# Patient Record
Sex: FEMALE | Race: WHITE | NOT HISPANIC OR LATINO | Employment: OTHER | URBAN - METROPOLITAN AREA
[De-identification: names, ages, dates, MRNs, and addresses within clinical notes are randomized per-mention and may not be internally consistent; named-entity substitution may affect disease eponyms.]

---

## 2017-01-19 ENCOUNTER — TELEPHONE (OUTPATIENT)
Dept: CARDIOLOGY | Facility: CLINIC | Age: 82
End: 2017-01-19

## 2017-01-19 NOTE — TELEPHONE ENCOUNTER
01/19/17  11:12 AM  I spoke with Sandra to see if she had appts to see patient today. She stated she could she patient today, but she suggested they may be more comfortable being seen tomorrow as Dr. Negro is in office.    I called April and offered them an appt today or tomorrow with Sandra Barrett. They accepted an appt with Sandra tomorrow, 1/20 at 1pm.    Latoya ANDERSON RN

## 2017-01-19 NOTE — TELEPHONE ENCOUNTER
01/19/17  9:31 AM  Lupe Sanchez  5/26/1926    Mobile [phone]     Ms. Simon calls about her mother who's HR starting yesterday has been 100-110. She states she called the service last night and was instructed to give her mother an extra dose of amiodarone (200mg). Ms. Hazel states it is very difficult to measure BP on this patient and last night she measured patient's BP at 100 palpated. Her BP has not been measured this morning. This morning . She was on 3L O2 with O2 sats in low 90s; she increased O2 to 4L and patient now sats 97%.     Ms. Hazel states patient's  of 70 years just passed away last week and feels her increased HR may be related to that. I see that she has canceled appts with [facility] and [facility] on 12/16, 12/17, and 1/11. I offered her an appt with [facility] this week. She states she would prefer not bring her mother in unless necessary.    Do you want to make medications adjustments? Do you want pt to be seen?    Latoya ANDERSON RN

## 2017-01-20 ENCOUNTER — OFFICE VISIT (OUTPATIENT)
Dept: CARDIOLOGY | Facility: CLINIC | Age: 82
End: 2017-01-20

## 2017-01-20 ENCOUNTER — CLINICAL SUPPORT NO REQUIREMENTS (OUTPATIENT)
Dept: CARDIOLOGY | Facility: CLINIC | Age: 82
End: 2017-01-20

## 2017-01-20 VITALS — WEIGHT: 150 LBS | HEIGHT: 68 IN | BODY MASS INDEX: 22.73 KG/M2 | HEART RATE: 80 BPM

## 2017-01-20 DIAGNOSIS — I48.19 PERSISTENT ATRIAL FIBRILLATION (HCC): Primary | ICD-10-CM

## 2017-01-20 DIAGNOSIS — I49.5 SICK SINUS SYNDROME (HCC): Primary | ICD-10-CM

## 2017-01-20 PROCEDURE — 99213 OFFICE O/P EST LOW 20 MIN: CPT | Performed by: NURSE PRACTITIONER

## 2017-01-20 PROCEDURE — 93000 ELECTROCARDIOGRAM COMPLETE: CPT | Performed by: NURSE PRACTITIONER

## 2017-01-20 PROCEDURE — 93280 PM DEVICE PROGR EVAL DUAL: CPT | Performed by: INTERNAL MEDICINE

## 2017-01-20 NOTE — PROGRESS NOTES
Date of Office Visit: 2017  Encounter Provider: CURTIS Sandoval  Place of Service: [facility] CARDIOLOGY  Patient Name: Lupe Sanchez  :1926    Chief Complaint   Patient presents with   • Atrial Fibrillation   • Congestive Heart Failure   • Hypertension   :     HPI: Lupe Sanchez is a 89 y.o. female comes in today for complaints of tachycardia and shortness of air.  She is a patient of Dr. Negro's and I'm seeing her for the first time today. She was first seen by Dr. Negro in 2015 and transferred care from a physician in [place].  She has a history of atrial fibrillation and diastolic heart failure. She was placed on amiodarone and digoxin at the time that she was diagnosed with atrial fibrillation.  She has a history of pre-existing lung disease and has used oxygen continuously.  She also has a pacemaker placed that was apparently Placed for tachycardia bradycardia syndrome.  This was placed in [place]. In May 2016, she went to the ER for syncope and was found to be hypotensive.  She was started on Midodrine at that time.   Today, she comes in a wheelchair.  She is accompanied by 2 of her daughters.  She reports that on Wednesday she was feeling rapid atrial fibrillation.  She had been feeling this for about 36 hours.  She called the answering service and she was told to take an extra amiodarone.  She is not on metoprolol because she is frequently hypotensive.  She recently lost her  of 71 years last .  She has also been fighting an upper respiratory infection, has been taking many nebulizers that contain albuterol.  She denies having any lower extremity edema.  She says she has them in her ankles and feet, but this is the same.  She denies chest pain.  She denies orthopnea or PND.  She does have shortness of breath, but again she is 89 years old.  This has not changed.  The morning after taking her extra dose of amiodarone, she did  feel better.  She has felt okay since then.          Past Medical History   Diagnosis Date   • Anxiety    • Arthritis    • Asthma    • Atrial fibrillation    • CHF (congestive heart failure)    • CVD (cerebrovascular disease)    • GERD (gastroesophageal reflux disease)    • Health care maintenance    • High risk medication use    • Hyperlipidemia    • Hypertension    • Hypothyroidism    • Hypoxia    • Pacemaker    • PAF (paroxysmal atrial fibrillation)    • Reactive airways dysfunction syndrome    • Tachycardia-bradycardia syndrome    • Transient cerebral ischemia        Past Surgical History   Procedure Laterality Date   • Hysterectomy     • Replacement total knee bilateral     • Pacemaker implantation             Review of Systems   Constitution: Positive for weakness and malaise/fatigue. Negative for fever.   HENT: Negative for ear pain, hearing loss, nosebleeds and sore throat.    Eyes: Negative for double vision, pain, vision loss in left eye, vision loss in right eye and visual disturbance.   Cardiovascular: Positive for leg swelling and palpitations. Negative for claudication.   Respiratory: Negative for cough, snoring and wheezing.    Endocrine: Negative for cold intolerance, heat intolerance and polyuria.   Skin: Negative for color change, itching and rash.   Musculoskeletal: Negative for joint pain, joint swelling and muscle cramps.   Gastrointestinal: Negative for abdominal pain, diarrhea, melena, nausea and vomiting.   Genitourinary: Negative for bladder incontinence and hematuria.   Neurological: Negative for excessive daytime sleepiness, dizziness, light-headedness, paresthesias and seizures.   Psychiatric/Behavioral: Negative for depression. The patient is not nervous/anxious.    All other systems reviewed and are negative.    All other systems reviewed and are negative    Allergies   Allergen Reactions   • Ciprofloxacin    • Levofloxacin        All aspects of family and social history reviewed.  "         Objective:     Vitals:    01/20/17 1314   Pulse: 80   Weight: 150 lb (68 kg)   Height: 68\" (172.7 cm)     Body mass index is 22.81 kg/(m^2).    PHYSICAL EXAM:  Physical Exam   Constitutional: She is oriented to person, place, and time.   HENT:   Head: Normocephalic and atraumatic.   Eyes: Right eye exhibits no discharge. Left eye exhibits no discharge.   Neck: No JVD present. No thyromegaly present.   Cardiovascular: Normal rate.  An irregularly irregular rhythm present. Exam reveals no gallop and no friction rub.    No murmur heard.  Pulmonary/Chest: Effort normal and breath sounds normal. She has no rales.   Abdominal: Soft. Bowel sounds are normal. There is no tenderness.   Musculoskeletal: Normal range of motion. She exhibits no edema or deformity.   Neurological: She is alert and oriented to person, place, and time. She exhibits normal muscle tone.   Skin: Skin is warm and dry. No erythema.   Psychiatric: She has a normal mood and affect. Her behavior is normal. Thought content normal.         ECG 12 Lead  Date/Time: 1/20/2017 1:59 PM  Performed by: JOSÉ MIGUEL BOYLE  Authorized by: JOSÉ MIGUEL BOYLE   Comparison: compared with previous ECG from 8/18/2016  Similar to previous ECG  Rhythm: atrial fibrillation  BPM: 80  Conduction: non-specific intraventricular conduction delay  Clinical impression: abnormal ECG  Comments: Indication: Atrial fibrillation                Assessment:       Diagnosis Plan   1. Persistent atrial fibrillation          Orders Placed This Encounter   Procedures   • ECG 12 Lead     This order was created via procedure documentation       Current Outpatient Prescriptions   Medication Sig Dispense Refill   • amiodarone (PACERONE) 200 MG tablet TAKE ONE TABLET BY MOUTH ONCE DAILY 30 tablet 5   • apixaban (ELIQUIS) 2.5 MG tablet tablet Take 2.5 mg by mouth 2 (two) times a day.     • furosemide (LASIX) 40 MG tablet Take 1.5 tablets by mouth daily. 30 tablet 0   • levothyroxine " (SYNTHROID, LEVOTHROID) 88 MCG tablet Take 88 mcg by mouth daily.     • midodrine (PROAMATINE) 10 MG tablet TAKE ONE TABLET BY MOUTH THREE TIMES DAILY 270 tablet 5   • montelukast (SINGULAIR) 10 MG tablet Take 10 mg by mouth every night.     • potassium chloride (K-DUR) 10 MEQ CR tablet Take 1 tablet by mouth daily. 30 tablet 0     No current facility-administered medications for this visit.             Plan:        1. Persistent atrial fibrillation-patient with episode of rapid atrial fibrillation at home.  Rate controlled with an extra dose of amiodarone.  Not sure if this is really the reason why the rate was more controlled.  Her rapid atrial fibrillation was in the setting of stress from loss of a spouse and due to nebulizer treatments.  Currently, she is rate controlled today.  I would continue her amiodarone 200 mg daily.  She is not on a beta blocker due to hypotension.  She takes Midodrine for hypotension.  She does not appear to be in heart failure at this time.  I would continue all medical therapy and follow up with Dr. Negro in 3 months.        Atrial Fibrillation and Atrial Flutter  Assessment  • The patient has permanent atrial fibrillation  • This is non-valvular in etiology  • The patient's CHADS2-VASc score is 6  • A [id number] score of 2 or more is considered a high risk for a thromboembolic event  • Apixaban prescribed    Plan  • Continue in atrial fibrillation with rate control  • Continue apixaban for antithrombotic therapy, bleeding issues discussed  • Continue amiodarone for rhythm control      As always, it has been a pleasure to participate in this patient's care.      Sincerely,      CURTIS Sandoval